# Patient Record
Sex: MALE | Race: WHITE | Employment: UNEMPLOYED | URBAN - METROPOLITAN AREA
[De-identification: names, ages, dates, MRNs, and addresses within clinical notes are randomized per-mention and may not be internally consistent; named-entity substitution may affect disease eponyms.]

---

## 2018-07-24 ENCOUNTER — TELEPHONE (OUTPATIENT)
Dept: FAMILY MEDICINE | Facility: CLINIC | Age: 18
End: 2018-07-24

## 2018-07-24 NOTE — TELEPHONE ENCOUNTER
Mom is calling would like to have patient seen for ADHD. Please call patient to schedule. Thank you.

## 2018-07-24 NOTE — TELEPHONE ENCOUNTER
Tried to call. The phone number has a voicemail that is not set up yet, could not leave a message.Snow Torres MA/NATHALIE

## 2018-07-26 NOTE — TELEPHONE ENCOUNTER
Called and spoke with patient. He states that he does have a current ADHD evaluation. I asked him to drop these off for Dr Shaffer to review and we will contact him after Dr Shaffer reviews them to set up an appointment.Snow Torres MA/NATHALIE

## 2022-02-28 ENCOUNTER — OFFICE VISIT (OUTPATIENT)
Dept: FAMILY MEDICINE | Facility: CLINIC | Age: 22
End: 2022-02-28
Payer: COMMERCIAL

## 2022-02-28 ENCOUNTER — NURSE TRIAGE (OUTPATIENT)
Dept: INTERNAL MEDICINE | Facility: CLINIC | Age: 22
End: 2022-02-28

## 2022-02-28 ENCOUNTER — ANCILLARY PROCEDURE (OUTPATIENT)
Dept: CT IMAGING | Facility: CLINIC | Age: 22
End: 2022-02-28
Attending: PHYSICIAN ASSISTANT
Payer: COMMERCIAL

## 2022-02-28 VITALS
HEIGHT: 71 IN | BODY MASS INDEX: 22.04 KG/M2 | HEART RATE: 71 BPM | OXYGEN SATURATION: 98 % | RESPIRATION RATE: 14 BRPM | DIASTOLIC BLOOD PRESSURE: 73 MMHG | SYSTOLIC BLOOD PRESSURE: 131 MMHG | WEIGHT: 157.4 LBS | TEMPERATURE: 97.3 F

## 2022-02-28 DIAGNOSIS — M54.2 NECK PAIN: ICD-10-CM

## 2022-02-28 DIAGNOSIS — M54.2 NECK PAIN: Primary | ICD-10-CM

## 2022-02-28 DIAGNOSIS — S06.0X0A CONCUSSION WITHOUT LOSS OF CONSCIOUSNESS, INITIAL ENCOUNTER: ICD-10-CM

## 2022-02-28 PROCEDURE — 72125 CT NECK SPINE W/O DYE: CPT | Mod: TC | Performed by: RADIOLOGY

## 2022-02-28 PROCEDURE — 99203 OFFICE O/P NEW LOW 30 MIN: CPT | Performed by: PHYSICIAN ASSISTANT

## 2022-02-28 ASSESSMENT — PAIN SCALES - GENERAL: PAINLEVEL: NO PAIN (0)

## 2022-02-28 NOTE — PATIENT INSTRUCTIONS
Lucero Paredes,    Thank you for allowing [facility] to manage your care.    I am unsure of the cause of your symptoms, but your exam is reassuring. We will see what our workup shows.     If you develop worsening/changing symptoms at any time, please call 911 or go to the emergency department for evaluation.    I ordered a CT, please go to the  to check in for the study. You may leave after the CT is done and I will call you with results.    I made a concussion clinic referral, they will be calling in approximately 1 week to set up your appointment.  If you do not hear from them, please call the specialty number on your after visit summary.     For your pain, please use Ibuprofen 400mg four times daily with food. Between ibuprofen doses, you may use Tylenol 650mg.     Max acetaminophen (Tylenol) 4,000mg/24 hours  Max ibuprofen 3,200mg/24 hours    If you have any questions or concerns, please feel free to call us at [phone]    Sincerely,    Wiliam Bocanegra PA-C    Did you know?      You can schedule a video visit for follow-up appointments as well as future appointments for certain conditions.  Please see the below link.     [email or web address]    If you have not already done so,  I encourage you to sign up for [facility] ([email or web address]).  This will allow you to review your results, securely communicate with a provider, and schedule virtual visits as well.      Patient Education   Concussion Discharge Instructions  You were seen today for signs of a concussion. The symptoms will vary, depending on the nature of your injury and your health. You may have: headache, confusion, nausea (feel sick to your stomach), vomiting (throwing up) and problems with memory, concentrating or sleep. You may feel dizzy, irritable, and tired.   Children and teens may need help from their parents, teachers and coaches to watch for symptoms as they  "recover.  Follow-up  It is important for you to see a doctor for follow-up care to see how you are recovering. Please see your primary doctor within the next 5 to 7 days. You may have also been referred to the Concussion  service. They will contact you and arrange a follow-up visit if needed. If you need help sooner, you may call them at [phone].  Warning signs  Call your doctor or come back to Emergency if you suddenly have any of these symptoms:    Headaches that get worse    Feeling more and more drowsy    You keep repeating yourself    Strange behavior    Seizures    Repeat vomiting (throwing up)    Trouble walking    Growing confusion    Feeling more irritable    Neck pain that gets worse    Slurred speech    Weakness or numbness    Loss of consciousness    Fluid or blood coming from ears or nose  Self-care    Get lots of rest and get enough sleep at night. Take daytime naps or rest if you feel tired.    Limit physical activity and \"thinking\" activities. These can make symptoms worse.  ? Physical activity includes gym, sports, weight training, running, exercise and heavy lifting.  ? Thinking activities include homework, class work, job-related work and screen time (phone, computer, tablet, TV and video games).    Stick to a healthy diet and drink lots of fluids.    As symptoms improve, you may slowly return to your daily activities. If symptoms get worse   or return, reduce your activity.    Know that it is normal to feel sad and frustrated when you do not feel right and are less active.  Going back to work    Your care team will tell you when you are ready to return to work.    Limit the amount of work you do soon after your injury. This may speed healing. Take breaks if your symptoms get worse. You should also reduce your physical activity as well as activities that require a lot of thinking until you see your doctor.    You may need shorter work days and a lighter workload.    Avoid heavy " "lifting, working with machinery, driving and working at heights until your symptoms are gone or you are cleared by a doctor.  Returning to sports    Never return to play if you have any symptoms. A full recovery will reduce the chances of getting hurt again. Remember, it is better to miss one or two games than a whole season.    You should rest from all physical activity until you see your doctor. Generally, if all symptoms have completely cleared, your doctor can help guide you to slowly return to sports. If symptoms return or worsen, stop the activity and see your doctor.    Important: If you are in an organized sport and under age 18, you will need written consent from a healthcare provider before you return to sports. Typically, this will be your primary care or sports medicine doctor. Please make an appointment.  Going back to school    If you are still having symptoms, you may need extra help at school.    Tell your teachers and school nurse about your injury and symptoms. Ask them to watch for problems with learning, memory and concentrating. Symptoms may get worse when you do schoolwork, and you may become more irritable.    You may need shorter school days, a reduced workload, and to postpone testing.    Do not drive or take gym class (physical activity) until cleared by a doctor.    For informational purposes only. Not to replace the advice of your health care provider.   2009 [employer]. Used with permission. This form is adapted from the \"Heads Up: Brain Injury in Your Practice\" tool kit developed by the Centers for Disease Control and Prevention (CDC). All rights reserved. [employer] [id number] - Rev 03/17.       Patient Education     Understanding Cervical Strain    There are 7 bones (vertebrae) in the neck that are part of the spine. These are called the cervical spine. Cervical strain is a medical term for neck pain. The neck has several " layers of muscles. These are connected with tendons to the cervical spine and other bones. Neck pain is often the result of injury to these muscles and tendons.   Causes of cervical strain  Different types of stress on the neck can damage muscles and tendons (soft tissues) and cause cervical strain. Cervical tissues can be damaged by:     The neck being forced past its normal range of motion, such as in a car accident or sports injury    Constant, low-level stress, such as from poor posture or a poorly set-up workspace  Symptoms of cervical strain  These may include:    Neck pain or stiffness    Pain in the shoulders or upper back    Muscle spasms    Headache, often starting at the base of the neck    Irritability, trouble concentrating, or sleeplessness    Numbness in the arm or hand    Tingling or weakness in the arm  Treatment for cervical strain  This problem often gets better on its own. Treatments aim to reduce pain and inflammation and increase the range of motion of the neck. Possible treatments include:     Over-the-counter or prescription medicine. These help relieve pain and inflammation.    Muscle relaxant can help with muscle spasms.    Stretching exercises to decrease neck stiffness.    Massage to decrease neck stiffness.    Cold or heat pack. These help reduce pain and swelling.  Call 911  Call 911 right away if you have any of these:     Face drooping or numbness    Numbness or weakness, especially in the arms or on one side    Slurred speech or difficulty speaking    Blurred vision  When to call your healthcare provider  Call your healthcare provider right away if you have any of these:    Fever of 100.4 F (38 C) or higher, or as directed by your provider    Chills    Pain or stiffness that gets worse    Symptoms that don t get better, or get worse    Numbness, tingling, weakness or shooting pains into the arms or legs    New symptoms  [employer] last reviewed this educational content on 6/1/2019     3960-4410 [employer]. All rights reserved. This information is not intended as a substitute for professional medical care. Always follow your healthcare professional's instructions.

## 2022-02-28 NOTE — TELEPHONE ENCOUNTER
Nurse Triage SBAR    Is this a 2nd Level Triage? NO    Situation  : Patient calls regarding symptoms following head injury.    Background  : Patient was snowboarding 2/26/22. Fell backwards off of snowboard. Head hit against hard packed snow. Patient was wearing a helmet.    Assessment : Patient experiencing slight dizziness, light headedness, neck pain, fatigue following injury. Patient states that speaking is difficult. Patient does not have slurred speech. Patient does not have a current headache, facial or orbital bruising. Patient does not have any laceration or bumps on back of head.    (See information below for more triage details.)    Recommendation : Routing to provider for imformation on prior to appointment.    Next 5 appointments (look out 90 days)    Feb 28, 2022  2:20 PM  (Arrive by 2:00 PM)  Provider Visit with YO Verma  [facility] ([facility] ) [address] [facility] [address]  [phone]            Protocol Recommended Disposition: Urgent office follow-up appointment       Reason for Disposition    After 3 days and headache persists    Additional Information    Negative: ACUTE NEUROLOGIC SYMPTOM and symptom present now    Negative: Knocked out (unconscious) > 1 minute    Negative: Seizure (convulsion) occurred (Exception: prior history of seizures and now alert and without Acute Neurologic Symptoms)    Negative: Neck pain after dangerous injury (e.g., MVA, diving, trampoline, contact sports, fall > 10 feet or 3 meters) (Exception: neck pain began > 1 hour after injury)    Negative: Major bleeding (actively dripping or spurting) that can't be stopped    Negative: Penetrating head injury (e.g., knife, gunshot wound, metal object)    Negative: Sounds like a life-threatening emergency to the triager    Negative: Recently examined and diagnosed with a concussion by a healthcare provider and has questions about concussion  "symptoms    Negative: Can't remember what happened (amnesia)    Negative: Vomiting once or more    Negative: Watery or blood-tinged fluid dripping from the nose or ears    Negative: ACUTE NEUROLOGIC SYMPTOM and now fine    Negative: Knocked out (unconscious) < 1 minute and now fine    Negative: Severe headache    Negative: Dangerous injury (e.g., MVA, diving, trampoline, contact sports, fall > 10 feet or 3 meters) or severe blow from hard object (e.g., golf club or baseball bat)    Negative: Large swelling or bruise > 2 inches (5 cm)    Negative: Skin is split open or gaping (length > 1/2 inch or 12 mm)    Negative: Bleeding won't stop after 10 minutes of direct pressure (using correct technique)    Negative: One or two 'black eyes' (bruising, purple color of eyelids), and onset within 24 hours of head injury    Negative: Taking Coumadin (warfarin) or other strong blood thinner, or known bleeding disorder (e.g., thrombocytopenia)    Negative: Age over 65 years with and area of head swelling or bruise    Negative: Sounds like a serious injury to the triager    Negative: Patient is confused or is an unreliable provider of information (e.g., dementia, severe intellectual disability, alcohol intoxication)    Negative: No prior tetanus shots (or is not fully vaccinated) and any wound (e.g., cut or scrape)    Negative: HIV positive or severe immunodeficiency (severely weak immune system) and DIRTY cut or scrape    Negative: Patient wants to be seen    Answer Assessment - Initial Assessment Questions  1. MECHANISM: \"How did the injury happen?\" For falls, ask: \"What height did you fall from?\" and \"What surface did you fall against?\"       Fell backwards. Hit head against hard packed snow.  2. ONSET: \"When did the injury happen?\" (Minutes or hours ago)       2/26/22  3. NEUROLOGIC SYMPTOMS: \"Was there any loss of consciousness?\" \"Are there any other neurological symptoms?\"       Lightheadedness, dizziness  4. MENTAL STATUS: " "\"Does the person know who he is, who you are, and where he is?\"      Yes  5. LOCATION: \"What part of the head was hit?\"       Back of head  6. SCALP APPEARANCE: \"What does the scalp look like? Is it bleeding now?\" If so, ask: \"Is it difficult to stop?\"       Did not develop any bump on back of head  7. SIZE: For cuts, bruises, or swelling, ask: \"How large is it?\" (e.g., inches or centimeters)       None  8. PAIN: \"Is there any pain?\" If so, ask: \"How bad is it?\"  (e.g., Scale 1-10; or mild, moderate, severe)      Headache was present 2/26/22, has resolved. Neck pain 7/10 with movement. 5/10 at rest.  9. TETANUS: For any breaks in the skin, ask: \"When was the last tetanus booster?\"      No  10. OTHER SYMPTOMS: \"Do you have any other symptoms?\" (e.g., neck pain, vomiting)        Neck pain, no nausea  11. PREGNANCY: \"Is there any chance you are pregnant?\" \"When was your last menstrual period?\"        No    Protocols used: HEAD INJURY-A-[place]      "

## 2022-02-28 NOTE — PROGRESS NOTES
Assessment & Plan   Problem List Items Addressed This Visit     None      Visit Diagnoses     Neck pain    -  Primary    Relevant Orders    CT Cervical Spine w/o Contrast (Completed)    Concussion without loss of consciousness, initial encounter        Relevant Orders    Concussion  Referral          Reggie Moran is a 21 year old male with no significant PMH presents c/o neck pain status post fall while snowboarding. DDx sprain/strain/fracture/contusion/dislocation/others. CT is negative for c spine fracture. Impression is sprain/strain, likely with a concussion. Will tx with analgesics otc, RICE/heat, concussion precautions and concussion referral prn.    Complete history and physical exam as below. AF with normal VS.    DDx and Dx discussed with and explained to the pt to their satisfaction.  All questions were answered at this time. Pt expressed understanding of and agreement with this dx, tx, and plan. No further workup warranted and standard medication warnings given. I have given the patient a list of pertinent indications for re-evaluation. Will go to the Emergency Department if symptoms worsen or new concerning symptoms arise. Patient left in no apparent distress.      See Patient Instructions    Return for a recheck as needed, or call 911/go to an ER anytime if worsening.    YO Verma  [facility] SHAYY Paredes is a 21 year old who presents for the following health issues     HPI     Pain History:  When did you first notice your pain? - Less than 1 week   Have you seen anyone else for your pain? No  Where in your body do you have pain? Neck Pain  Onset/Duration: 2/26/22  Description:   Location: bilateral neck  Radiation: none  Intensity: moderate  Progression of Symptoms:  same and intermittent  Accompanying Signs & Symptoms:  Burning, tingling, prickly sensation in arm(s): no  Numbness in arm(s): no  Weakness in arm(s):  no  Fever: no  Headache:  "YES  Nausea and/or vomiting: YES- nausea  History:   Trauma: YES- snowboarding accident  Previous neck pain: no  Previous surgery or injections: no  Previous Imaging (MRI,X ray): YES- maybe once when he was a kid  Precipitating or alleviating factors: moving it and first thing in the morning makes it worse; nothing makes it better  Does movement impact the pain:  YES  Therapies tried and outcome: None  ranges from a 3 to 7/10    Dizziness  Onset/Duration: 1 day  Description:   Do you feel faint: YES  Does it feel like the surroundings (bed, room) are moving: no  Unsteady/off balance: no  Have you passed out or fallen: no  Intensity: moderate  Progression of Symptoms: same and intermittent  Accompanying Signs & Symptoms:  Heart palpitations or chest pain: no  Nausea, vomiting: YES- nausea  Weakness or lack of coordination in arms or legs: no  Vision or speech changes: no  Numbness or tingling: no  Ringing in ears (Tinnitus): no  Hearing Loss: no  History:   Head trauma/concussion history: YES- fell on head  Previous similar symptoms: no  Recent bleeding history: no  Any new medications (BP?): no  Precipitating factors:   Worse with activity: YES  Worse with head movement: YES  Alleviating factors:   Does staying in a fixed position give relief: YES  Therapies tried and outcome: None      Review of Systems   Constitutional, HEENT, cardiovascular, pulmonary, gi and gu systems are negative, except as otherwise noted.      Objective    /73   Pulse 71   Temp 97.3  F (36.3  C) (Tympanic)   Resp 14   Ht 1.81 m (5' 11.26\")   Wt 71.4 kg (157 lb 6.4 oz)   SpO2 98%   BMI 21.79 kg/m    Body mass index is 21.79 kg/m .  Physical Exam  Vitals and nursing note reviewed.   Constitutional:       General: He is not in acute distress.     Appearance: He is not ill-appearing or diaphoretic.   HENT:      Head: Normocephalic and atraumatic.      Comments: No sommers sign, raccoon eyes or hemotympanum. Skull and facial bones " non-tender.      Mouth/Throat:      Mouth: Mucous membranes are moist.   Eyes:      Conjunctiva/sclera: Conjunctivae normal.   Neck:      Comments: Midline tenderness diffusely through the c spine.  No overlying signs of trauma or infection.    Cardiovascular:      Rate and Rhythm: Normal rate and regular rhythm.      Heart sounds: Normal heart sounds. No murmur heard.    No friction rub. No gallop.   Pulmonary:      Effort: Pulmonary effort is normal. No respiratory distress.      Breath sounds: Normal breath sounds. No stridor. No wheezing, rhonchi or rales.   Abdominal:      General: Bowel sounds are normal. There is no distension.      Palpations: Abdomen is soft. There is no mass.      Tenderness: There is no abdominal tenderness. There is no guarding or rebound.      Hernia: No hernia is present.   Musculoskeletal:      Cervical back: Normal range of motion and neck supple.      Comments: No midline thoracic or lumbar spinal tenderness.     Skin:     General: Skin is warm and dry.   Neurological:      General: No focal deficit present.      Mental Status: He is alert. Mental status is at baseline.   Psychiatric:         Mood and Affect: Mood normal.         Behavior: Behavior normal.          Results for orders placed or performed in visit on 02/28/22   CT Cervical Spine w/o Contrast     Status: None    Narrative    CT CERVICAL SPINE W/O CONTRAST 2/28/2022 3:37 PM    INDICATION: Trauma - C-Spine Injury; Neck pain  TECHNIQUE: CT scan of the cervical spine without contrast. Dose  reduction techniques were used.  COMPARISON: None.    FINDINGS:   Slight convex left cervical curvature with slight straightening of  usual lordosis. Vertebral body heights preserved. No fracture. No  prevertebral soft tissue swelling. Patent spinal canal and neural  foramina. No significant degenerative change.      Impression    IMPRESSION:  No acute cervical spine fracture. Adequate appearing spinal canal and  neural  foramina.    PRERNA FAROOQ MD         SYSTEM ID:  [id number]